# Patient Record
Sex: MALE | Race: WHITE | NOT HISPANIC OR LATINO | Employment: UNEMPLOYED | URBAN - METROPOLITAN AREA
[De-identification: names, ages, dates, MRNs, and addresses within clinical notes are randomized per-mention and may not be internally consistent; named-entity substitution may affect disease eponyms.]

---

## 2018-08-02 ENCOUNTER — WALK IN (OUTPATIENT)
Dept: URGENT CARE | Age: 23
End: 2018-08-02

## 2018-08-02 VITALS
DIASTOLIC BLOOD PRESSURE: 70 MMHG | WEIGHT: 180.89 LBS | SYSTOLIC BLOOD PRESSURE: 122 MMHG | TEMPERATURE: 98.3 F | RESPIRATION RATE: 16 BRPM | HEART RATE: 75 BPM | OXYGEN SATURATION: 98 %

## 2018-08-02 DIAGNOSIS — J02.9 PHARYNGITIS, UNSPECIFIED ETIOLOGY: ICD-10-CM

## 2018-08-02 DIAGNOSIS — J03.90 TONSILLITIS: Primary | ICD-10-CM

## 2018-08-02 LAB — S PYO AG THROAT QL: NEGATIVE

## 2018-08-02 PROCEDURE — 99213 OFFICE O/P EST LOW 20 MIN: CPT | Performed by: PHYSICIAN ASSISTANT

## 2018-08-02 PROCEDURE — 87880 STREP A ASSAY W/OPTIC: CPT | Performed by: INTERNAL MEDICINE

## 2018-08-02 PROCEDURE — 87081 CULTURE SCREEN ONLY: CPT | Performed by: INTERNAL MEDICINE

## 2018-08-04 LAB
REPORT STATUS (RPT): NORMAL
S PYO SPEC QL CULT: NORMAL
SPECIMEN SOURCE: NORMAL

## 2018-08-05 ENCOUNTER — WALK IN (OUTPATIENT)
Dept: URGENT CARE | Age: 23
End: 2018-08-05

## 2018-08-05 VITALS
OXYGEN SATURATION: 99 % | DIASTOLIC BLOOD PRESSURE: 80 MMHG | WEIGHT: 176 LBS | TEMPERATURE: 98.2 F | SYSTOLIC BLOOD PRESSURE: 120 MMHG | HEART RATE: 92 BPM

## 2018-08-05 DIAGNOSIS — J03.90 TONSILLITIS: Primary | ICD-10-CM

## 2018-08-05 PROCEDURE — 99212 OFFICE O/P EST SF 10 MIN: CPT | Performed by: FAMILY MEDICINE

## 2018-08-05 RX ORDER — AMOXICILLIN AND CLAVULANATE POTASSIUM 875; 125 MG/1; MG/1
1 TABLET, FILM COATED ORAL EVERY 12 HOURS
Qty: 20 TABLET | Refills: 0 | Status: SHIPPED | OUTPATIENT
Start: 2018-08-05 | End: 2019-09-02 | Stop reason: ALTCHOICE

## 2018-08-08 ENCOUNTER — TELEPHONE (OUTPATIENT)
Dept: TELEHEALTH | Age: 23
End: 2018-08-08

## 2019-08-29 ENCOUNTER — WALK IN (OUTPATIENT)
Dept: URGENT CARE | Age: 24
End: 2019-08-29

## 2019-08-29 DIAGNOSIS — J02.9 VIRAL PHARYNGITIS: Primary | ICD-10-CM

## 2019-08-29 DIAGNOSIS — J02.9 SORE THROAT: ICD-10-CM

## 2019-08-29 LAB
INTERNAL PROCEDURAL CONTROLS ACCEPTABLE: YES
S PYO AG THROAT QL IA.RAPID: NEGATIVE

## 2019-08-29 PROCEDURE — 87880 STREP A ASSAY W/OPTIC: CPT | Performed by: PHYSICIAN ASSISTANT

## 2019-08-29 PROCEDURE — 99213 OFFICE O/P EST LOW 20 MIN: CPT | Performed by: PHYSICIAN ASSISTANT

## 2019-08-29 ASSESSMENT — PAIN SCALES - GENERAL: PAINLEVEL: 3-4

## 2019-09-02 ENCOUNTER — WALK IN (OUTPATIENT)
Dept: URGENT CARE | Age: 24
End: 2019-09-02

## 2019-09-02 VITALS
OXYGEN SATURATION: 99 % | BODY MASS INDEX: 21.76 KG/M2 | DIASTOLIC BLOOD PRESSURE: 78 MMHG | HEART RATE: 84 BPM | HEIGHT: 75 IN | WEIGHT: 175 LBS | SYSTOLIC BLOOD PRESSURE: 128 MMHG | TEMPERATURE: 98.4 F | RESPIRATION RATE: 14 BRPM

## 2019-09-02 DIAGNOSIS — J01.00 ACUTE NON-RECURRENT MAXILLARY SINUSITIS: Primary | ICD-10-CM

## 2019-09-02 PROCEDURE — 99202 OFFICE O/P NEW SF 15 MIN: CPT | Performed by: EMERGENCY MEDICINE

## 2019-09-02 RX ORDER — AMOXICILLIN AND CLAVULANATE POTASSIUM 500; 125 MG/1; MG/1
1 TABLET, FILM COATED ORAL 2 TIMES DAILY
Qty: 20 TABLET | Refills: 0 | Status: SHIPPED | OUTPATIENT
Start: 2019-09-02 | End: 2019-09-12

## 2019-09-02 ASSESSMENT — ENCOUNTER SYMPTOMS
COUGH: 1
SHORTNESS OF BREATH: 0
ABDOMINAL PAIN: 0
SORE THROAT: 1
FEVER: 0
DIARRHEA: 0
APPETITE CHANGE: 0
SINUS PRESSURE: 1
VOMITING: 0

## 2019-09-13 ENCOUNTER — LAB SERVICES (OUTPATIENT)
Dept: LAB | Age: 24
End: 2019-09-13

## 2019-09-13 ENCOUNTER — WALK IN (OUTPATIENT)
Dept: URGENT CARE | Age: 24
End: 2019-09-13

## 2019-09-13 VITALS
RESPIRATION RATE: 16 BRPM | SYSTOLIC BLOOD PRESSURE: 107 MMHG | WEIGHT: 181 LBS | DIASTOLIC BLOOD PRESSURE: 72 MMHG | TEMPERATURE: 98.7 F | HEIGHT: 75 IN | HEART RATE: 112 BPM | BODY MASS INDEX: 22.5 KG/M2

## 2019-09-13 DIAGNOSIS — Z11.3 SCREEN FOR STD (SEXUALLY TRANSMITTED DISEASE): ICD-10-CM

## 2019-09-13 DIAGNOSIS — Z11.3 SCREEN FOR STD (SEXUALLY TRANSMITTED DISEASE): Primary | ICD-10-CM

## 2019-09-13 PROCEDURE — 87491 CHLMYD TRACH DNA AMP PROBE: CPT | Performed by: INTERNAL MEDICINE

## 2019-09-13 PROCEDURE — 87591 N.GONORRHOEAE DNA AMP PROB: CPT | Performed by: INTERNAL MEDICINE

## 2019-09-13 PROCEDURE — 86695 HERPES SIMPLEX TYPE 1 TEST: CPT | Performed by: INTERNAL MEDICINE

## 2019-09-13 PROCEDURE — 86592 SYPHILIS TEST NON-TREP QUAL: CPT | Performed by: INTERNAL MEDICINE

## 2019-09-13 PROCEDURE — 99212 OFFICE O/P EST SF 10 MIN: CPT | Performed by: FAMILY MEDICINE

## 2019-09-13 PROCEDURE — 36415 COLL VENOUS BLD VENIPUNCTURE: CPT | Performed by: INTERNAL MEDICINE

## 2019-09-13 PROCEDURE — 87389 HIV-1 AG W/HIV-1&-2 AB AG IA: CPT | Performed by: INTERNAL MEDICINE

## 2019-09-13 PROCEDURE — 86696 HERPES SIMPLEX TYPE 2 TEST: CPT | Performed by: INTERNAL MEDICINE

## 2019-09-14 LAB
HIV 1+2 AB+HIV1 P24 AG SERPL QL IA: NONREACTIVE
RPR SER QL: NONREACTIVE

## 2019-09-16 ENCOUNTER — TELEPHONE (OUTPATIENT)
Dept: URGENT CARE | Age: 24
End: 2019-09-16

## 2019-09-16 LAB
C TRACH RRNA SPEC QL NAA+PROBE: NEGATIVE
HSV1 GG IGG SER-ACNC: POSITIVE
HSV2 GG IGG SER-ACNC: NEGATIVE
N GONORRHOEA RRNA SPEC QL NAA+PROBE: NEGATIVE
SPECIMEN SOURCE: NORMAL

## 2019-10-18 ENCOUNTER — CASE MANAGEMENT (OUTPATIENT)
Dept: INTERNAL MEDICINE | Age: 24
End: 2019-10-18

## 2019-10-18 DIAGNOSIS — Z23 NEED FOR INFLUENZA VACCINATION: ICD-10-CM

## 2019-10-18 PROCEDURE — 90686 IIV4 VACC NO PRSV 0.5 ML IM: CPT | Performed by: FAMILY MEDICINE

## 2019-10-18 PROCEDURE — 90471 IMMUNIZATION ADMIN: CPT | Performed by: FAMILY MEDICINE

## 2020-01-10 ENCOUNTER — OFFICE VISIT (OUTPATIENT)
Dept: OCCUPATIONAL MEDICINE | Age: 25
End: 2020-01-10

## 2020-01-10 DIAGNOSIS — Z02.89 VISIT FOR OCCUPATIONAL HEALTH EXAMINATION: Primary | ICD-10-CM

## 2020-01-10 PROCEDURE — OH035 DOT/N-DOT DS COLLECTION ONLY (ALL TYPES): Performed by: PREVENTIVE MEDICINE

## 2021-03-03 ENCOUNTER — OFFICE VISIT (OUTPATIENT)
Dept: OCCUPATIONAL MEDICINE | Age: 26
End: 2021-03-03

## 2021-03-03 DIAGNOSIS — Z02.89 VISIT FOR OCCUPATIONAL HEALTH EXAMINATION: Primary | ICD-10-CM

## 2021-03-03 PROCEDURE — OH123 RAPID TEST DRUG KIT & COLLECTION 5 PANEL: Performed by: PHYSICIAN ASSISTANT

## 2021-05-25 VITALS
SYSTOLIC BLOOD PRESSURE: 124 MMHG | DIASTOLIC BLOOD PRESSURE: 78 MMHG | WEIGHT: 184 LBS | BODY MASS INDEX: 22.88 KG/M2 | RESPIRATION RATE: 16 BRPM | HEART RATE: 82 BPM | OXYGEN SATURATION: 97 % | HEIGHT: 75 IN | TEMPERATURE: 99.4 F

## 2021-08-02 ENCOUNTER — WALK IN (OUTPATIENT)
Dept: URGENT CARE | Age: 26
End: 2021-08-02

## 2021-08-02 VITALS
TEMPERATURE: 98.7 F | SYSTOLIC BLOOD PRESSURE: 132 MMHG | OXYGEN SATURATION: 97 % | RESPIRATION RATE: 14 BRPM | DIASTOLIC BLOOD PRESSURE: 94 MMHG | HEART RATE: 74 BPM

## 2021-08-02 DIAGNOSIS — S61.412A LACERATION OF LEFT HAND WITHOUT FOREIGN BODY, INITIAL ENCOUNTER: Primary | ICD-10-CM

## 2021-08-02 PROCEDURE — 12002 RPR S/N/AX/GEN/TRNK2.6-7.5CM: CPT | Performed by: PHYSICIAN ASSISTANT

## 2021-08-02 PROCEDURE — 90471 IMMUNIZATION ADMIN: CPT | Performed by: PHYSICIAN ASSISTANT

## 2021-08-02 PROCEDURE — 90715 TDAP VACCINE 7 YRS/> IM: CPT | Performed by: PHYSICIAN ASSISTANT

## 2021-08-02 PROCEDURE — 99213 OFFICE O/P EST LOW 20 MIN: CPT | Performed by: PHYSICIAN ASSISTANT

## 2021-08-02 RX ORDER — CETIRIZINE HYDROCHLORIDE 10 MG/1
10 TABLET ORAL DAILY
COMMUNITY

## 2021-08-03 ASSESSMENT — ENCOUNTER SYMPTOMS
NUMBNESS: 0
WOUND: 1
WEAKNESS: 0

## 2021-08-08 ENCOUNTER — TELEPHONE (OUTPATIENT)
Dept: URGENT CARE | Age: 26
End: 2021-08-08

## 2021-08-08 ENCOUNTER — WALK IN (OUTPATIENT)
Dept: URGENT CARE | Age: 26
End: 2021-08-08

## 2021-08-08 VITALS
OXYGEN SATURATION: 98 % | HEART RATE: 87 BPM | RESPIRATION RATE: 16 BRPM | SYSTOLIC BLOOD PRESSURE: 130 MMHG | TEMPERATURE: 99.2 F | DIASTOLIC BLOOD PRESSURE: 70 MMHG

## 2021-08-08 DIAGNOSIS — S61.012D LACERATION OF LEFT THUMB WITHOUT FOREIGN BODY WITHOUT DAMAGE TO NAIL, SUBSEQUENT ENCOUNTER: ICD-10-CM

## 2021-08-08 DIAGNOSIS — Z48.02 VISIT FOR SUTURE REMOVAL: Primary | ICD-10-CM

## 2021-08-08 PROBLEM — T14.8XXA WOUND INFECTION: Status: ACTIVE | Noted: 2021-08-08

## 2021-08-08 PROBLEM — L08.9 WOUND INFECTION: Status: ACTIVE | Noted: 2021-08-08

## 2021-08-08 RX ORDER — CEPHALEXIN 500 MG/1
500 CAPSULE ORAL 4 TIMES DAILY
Qty: 40 CAPSULE | Refills: 0 | Status: SHIPPED | OUTPATIENT
Start: 2021-08-08

## 2021-08-12 ENCOUNTER — WALK IN (OUTPATIENT)
Dept: URGENT CARE | Age: 26
End: 2021-08-12

## 2021-08-12 VITALS
SYSTOLIC BLOOD PRESSURE: 116 MMHG | WEIGHT: 185 LBS | OXYGEN SATURATION: 97 % | TEMPERATURE: 98.4 F | HEIGHT: 75 IN | HEART RATE: 97 BPM | BODY MASS INDEX: 23 KG/M2 | RESPIRATION RATE: 16 BRPM | DIASTOLIC BLOOD PRESSURE: 64 MMHG

## 2021-08-12 DIAGNOSIS — Z51.89 VISIT FOR WOUND CHECK: Primary | ICD-10-CM

## 2021-08-12 ASSESSMENT — ENCOUNTER SYMPTOMS
CHILLS: 0
WOUND: 1
FEVER: 0
COLOR CHANGE: 0

## 2021-08-12 ASSESSMENT — PAIN SCALES - GENERAL: PAINLEVEL: 0

## 2023-01-05 ENCOUNTER — ANCILLARY PROCEDURE (OUTPATIENT)
Dept: GENERAL RADIOLOGY | Facility: CLINIC | Age: 28
End: 2023-01-05
Attending: INTERNAL MEDICINE
Payer: COMMERCIAL

## 2023-01-05 ENCOUNTER — OFFICE VISIT (OUTPATIENT)
Dept: FAMILY MEDICINE | Facility: CLINIC | Age: 28
End: 2023-01-05
Payer: COMMERCIAL

## 2023-01-05 VITALS
TEMPERATURE: 98.1 F | HEART RATE: 94 BPM | OXYGEN SATURATION: 99 % | SYSTOLIC BLOOD PRESSURE: 124 MMHG | RESPIRATION RATE: 16 BRPM | WEIGHT: 193 LBS | DIASTOLIC BLOOD PRESSURE: 76 MMHG

## 2023-01-05 DIAGNOSIS — M54.50 ACUTE BILATERAL LOW BACK PAIN WITHOUT SCIATICA: Primary | ICD-10-CM

## 2023-01-05 DIAGNOSIS — M54.50 ACUTE BILATERAL LOW BACK PAIN WITHOUT SCIATICA: ICD-10-CM

## 2023-01-05 PROCEDURE — 72100 X-RAY EXAM L-S SPINE 2/3 VWS: CPT | Mod: TC | Performed by: RADIOLOGY

## 2023-01-05 PROCEDURE — 99203 OFFICE O/P NEW LOW 30 MIN: CPT | Performed by: INTERNAL MEDICINE

## 2023-01-05 ASSESSMENT — ENCOUNTER SYMPTOMS
ABDOMINAL PAIN: 0
ABDOMINAL DISTENTION: 0
FATIGUE: 0
PARESTHESIAS: 0
BACK PAIN: 1
CHILLS: 0
PALPITATIONS: 0
NUMBNESS: 0
ARTHRALGIAS: 1
FEVER: 0
UNEXPECTED WEIGHT CHANGE: 0
WEAKNESS: 0
SHORTNESS OF BREATH: 0
COUGH: 0

## 2023-01-05 ASSESSMENT — PAIN SCALES - GENERAL: PAINLEVEL: SEVERE PAIN (6)

## 2023-01-05 NOTE — PROGRESS NOTES
Assessment & Plan     Acute bilateral low back pain without sciatica  1 month of low back pain without sciatica, neurologic or other findings on exam, or any red flag symptoms.  Lumbosacral spine x-ray negative.    Ibuprofen as needed for pain.  Lidocaine patch as needed.  Referral for PT.  Patient should call or message me if not better in a couple of weeks                 No follow-ups on file.    Ryan Mcneill MD  [facility]    Imani Lui is a 27 year old, presenting for the following health issues:  Back Pain (Low back pain radiating into the hips. No known injury.)      Back Pain   Pertinent negatives include no chest pain, no fever, no numbness, no abdominal pain, no paresthesias and no weakness.   History of Present Illness       Back Pain:  He presents for follow up of back pain. Patient's back pain is a new problem.    Original cause of back pain: not sure  First noticed back pain: more than 1 month ago  Patient feels back pain: constantlyLocation of back pain:  Right lower back, left lower back, right hip and left hip  Description of back pain: dull ache, sharp and shooting  Back pain spreads: right thigh and left thigh    Since patient first noticed back pain, pain is: always present, but gets better and worse  Does back pain interfere with his job:  Yes  On a scale of 1-10 (10 being the worst), patient describes pain as:  8  What makes back pain worse: bending, certain positions, sitting and twisting  Acupuncture: not tried  Acetaminophen: not helpful  Activity or exercise: not helpful  Chiropractor:  Not tried  Cold: not helpful  Heat: helpful  Massage: not tried  Muscle relaxants: not tried  NSAIDS: not helpful  Opioids: not tried  Physical Therapy: not helpful  Rest: not helpful  Steroid Injection: not tried  Stretching: not tried  Surgery: not tried  TENS unit: not tried  Topical pain relievers: not tried  Other healthcare providers patient is seeing for back  pain: None    He eats 2-3 servings of fruits and vegetables daily.He consumes 0 sweetened beverage(s) daily.He exercises with enough effort to increase his heart rate 20 to 29 minutes per day.  He exercises with enough effort to increase his heart rate 4 days per week.   He is taking medications regularly.     Hu is a healthy 27-year-old  who presents with 1 month of low back pain.  He had to drive to [place] and had to get out of the car at frequent intervals because of the discomfort.  Does not radiate to his legs.  No leg weakness or paresthesias.  No bowel or bladder symptoms.  No fever or history of cancer.  No trauma.  He has a hobby of woodworking and had lifted a table saw out of his truck but not directly before this happened.        Review of Systems   Constitutional: Negative for chills, fatigue, fever and unexpected weight change.   HENT: Negative for congestion.    Eyes: Negative for visual disturbance.   Respiratory: Negative for cough and shortness of breath.    Cardiovascular: Negative for chest pain, palpitations and peripheral edema.   Gastrointestinal: Negative for abdominal distention and abdominal pain.   Musculoskeletal: Positive for arthralgias and back pain.   Neurological: Negative for weakness, numbness and paresthesias.            Objective    /76 (BP Location: Right arm, Patient Position: Sitting)   Pulse 94   Temp 98.1  F (36.7  C) (Tympanic)   Resp 16   Wt 87.5 kg (193 lb)   SpO2 99%   There is no height or weight on file to calculate BMI.  Physical Exam   Patient is a healthy-appearing young man in no distress  Lungs clear  Cardiac exam regular.  No edema.  Normal carotid and posterior tibial pulsations  Abdomen nontender  Back nontender.  Back is a normal range of motion, he is able to touch his toes.  Strength in the legs normal.  Gait, toe, heel walking, and Trendelenburg normal bilaterally.  Sensation intact in the feet and lower leg to vibration and  monofilament.  Feet normal    Lumbar spine sacral spine x-ray to my eye appears normal.

## 2023-01-19 ASSESSMENT — ENCOUNTER SYMPTOMS
PALPITATIONS: 0
CHILLS: 0
WEAKNESS: 0
HEMATOCHEZIA: 0
HEARTBURN: 0
DIARRHEA: 0
DIZZINESS: 0
DYSURIA: 0
HEADACHES: 0
NERVOUS/ANXIOUS: 1
SHORTNESS OF BREATH: 0
NAUSEA: 0
SORE THROAT: 0
PARESTHESIAS: 0
ARTHRALGIAS: 0
MYALGIAS: 0
FEVER: 0
EYE PAIN: 0
CONSTIPATION: 0
ABDOMINAL PAIN: 0
JOINT SWELLING: 0
HEMATURIA: 0
FREQUENCY: 0
COUGH: 0

## 2023-01-26 ENCOUNTER — OFFICE VISIT (OUTPATIENT)
Dept: INTERNAL MEDICINE | Facility: CLINIC | Age: 28
End: 2023-01-26
Payer: COMMERCIAL

## 2023-01-26 VITALS
SYSTOLIC BLOOD PRESSURE: 128 MMHG | OXYGEN SATURATION: 99 % | BODY MASS INDEX: 23.38 KG/M2 | DIASTOLIC BLOOD PRESSURE: 76 MMHG | HEART RATE: 64 BPM | TEMPERATURE: 97.9 F | RESPIRATION RATE: 20 BRPM | WEIGHT: 188 LBS | HEIGHT: 75 IN

## 2023-01-26 DIAGNOSIS — Z11.59 NEED FOR HEPATITIS C SCREENING TEST: ICD-10-CM

## 2023-01-26 DIAGNOSIS — Z11.4 SCREENING FOR HIV (HUMAN IMMUNODEFICIENCY VIRUS): ICD-10-CM

## 2023-01-26 DIAGNOSIS — Z00.00 ROUTINE GENERAL MEDICAL EXAMINATION AT A HEALTH CARE FACILITY: Primary | ICD-10-CM

## 2023-01-26 LAB
ERYTHROCYTE [DISTWIDTH] IN BLOOD BY AUTOMATED COUNT: 11.3 % (ref 10–15)
HCT VFR BLD AUTO: 47.5 % (ref 40–53)
HGB BLD-MCNC: 16.7 G/DL (ref 13.3–17.7)
MCH RBC QN AUTO: 30.5 PG (ref 26.5–33)
MCHC RBC AUTO-ENTMCNC: 35.2 G/DL (ref 31.5–36.5)
MCV RBC AUTO: 87 FL (ref 78–100)
PLATELET # BLD AUTO: 231 10E3/UL (ref 150–450)
RBC # BLD AUTO: 5.48 10E6/UL (ref 4.4–5.9)
WBC # BLD AUTO: 6.4 10E3/UL (ref 4–11)

## 2023-01-26 PROCEDURE — 36415 COLL VENOUS BLD VENIPUNCTURE: CPT

## 2023-01-26 PROCEDURE — 99395 PREV VISIT EST AGE 18-39: CPT | Mod: 25

## 2023-01-26 PROCEDURE — 86803 HEPATITIS C AB TEST: CPT

## 2023-01-26 PROCEDURE — 85027 COMPLETE CBC AUTOMATED: CPT

## 2023-01-26 PROCEDURE — 90686 IIV4 VACC NO PRSV 0.5 ML IM: CPT

## 2023-01-26 PROCEDURE — 87389 HIV-1 AG W/HIV-1&-2 AB AG IA: CPT

## 2023-01-26 PROCEDURE — 80061 LIPID PANEL: CPT

## 2023-01-26 PROCEDURE — 90651 9VHPV VACCINE 2/3 DOSE IM: CPT

## 2023-01-26 PROCEDURE — 90471 IMMUNIZATION ADMIN: CPT

## 2023-01-26 PROCEDURE — 80053 COMPREHEN METABOLIC PANEL: CPT

## 2023-01-26 PROCEDURE — 90472 IMMUNIZATION ADMIN EACH ADD: CPT

## 2023-01-26 ASSESSMENT — ENCOUNTER SYMPTOMS
NAUSEA: 0
HEMATURIA: 0
DIARRHEA: 0
HEADACHES: 0
WEAKNESS: 0
JOINT SWELLING: 0
PALPITATIONS: 0
PARESTHESIAS: 0
CONSTIPATION: 0
HEMATOCHEZIA: 0
DYSURIA: 0
EYE PAIN: 0
MYALGIAS: 0
CHILLS: 0
COUGH: 0
HEARTBURN: 0
FREQUENCY: 0
ARTHRALGIAS: 0
SHORTNESS OF BREATH: 0
SORE THROAT: 0
ABDOMINAL PAIN: 0
NERVOUS/ANXIOUS: 1
FEVER: 0
DIZZINESS: 0

## 2023-01-26 NOTE — PATIENT INSTRUCTIONS
Get medical records from previous facility     Exercise, journaling, deep breathing, and potentially therapy    Our lab is in [address]. I will comment on your results when they are all back.

## 2023-01-26 NOTE — PROGRESS NOTES
SUBJECTIVE:   CC: Hu is an 27 year old who presents for preventative health visit.     Healthy Habits:     Getting at least 3 servings of Calcium per day:  Yes    Bi-annual eye exam:  Yes    Dental care twice a year:  Yes    Sleep apnea or symptoms of sleep apnea:  None    Diet:  Regular (no restrictions)    Frequency of exercise:  4-5 days/week    Duration of exercise:  30-45 minutes    Taking medications regularly:  Not Applicable    Medication side effects:  Not applicable    PHQ-2 Total Score: 2    Additional concerns today:  No      Today's PHQ-2 Score:   PHQ-2 ( 1999 Pfizer) 1/19/2023   Q1: Little interest or pleasure in doing things 1   Q2: Feeling down, depressed or hopeless 1   PHQ-2 Score 2   Q1: Little interest or pleasure in doing things Several days   Q2: Feeling down, depressed or hopeless Several days   PHQ-2 Score 2       Have you ever done Advance Care Planning? (For example, a Health Directive, POLST, or a discussion with a medical provider or your loved ones about your wishes): No, advance care planning information given to patient to review.  Patient declined advance care planning discussion at this time.    Social History     Tobacco Use     Smoking status: Never     Smokeless tobacco: Never   Substance Use Topics     Alcohol use: Yes     Alcohol/week: 1.0 standard drink     Types: 1 Standard drinks or equivalent per week     Comment: 1-2 beer with dinner         Alcohol Use 1/19/2023   Prescreen: >3 drinks/day or >7 drinks/week? Yes   AUDIT SCORE  5       Last PSA: No results found for: PSA    Reviewed orders with patient. Reviewed health maintenance and updated orders accordingly - Yes  Lab work is in process    Reviewed and updated as needed this visit by clinical staff   Tobacco  Allergies  Meds  Problems  Med Hx  Surg Hx  Fam Hx  Soc   Hx        Reviewed and updated as needed this visit by Provider   Tobacco   Meds     Fam Hx  Soc Hx       History reviewed. No pertinent past  "medical history.   Past Surgical History:   Procedure Laterality Date     NO HISTORY OF SURGERY         Review of Systems   Constitutional: Negative for chills and fever.   HENT: Negative for congestion, ear pain, hearing loss and sore throat.    Eyes: Negative for pain and visual disturbance.   Respiratory: Negative for cough and shortness of breath.    Cardiovascular: Negative for chest pain, palpitations and peripheral edema.   Gastrointestinal: Negative for abdominal pain, constipation, diarrhea, heartburn, hematochezia and nausea.   Genitourinary: Negative for dysuria, frequency, genital sores, hematuria, impotence, penile discharge and urgency.   Musculoskeletal: Negative for arthralgias, joint swelling and myalgias.   Skin: Negative for rash.   Neurological: Negative for dizziness, weakness, headaches and paresthesias.   Psychiatric/Behavioral: Negative for mood changes. The patient is nervous/anxious.      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     OBJECTIVE:   /76   Pulse 64   Temp 97.9  F (36.6  C) (Oral)   Resp 20   Ht 1.905 m (6' 3\")   Wt 85.3 kg (188 lb)   SpO2 99%   BMI 23.50 kg/m      Physical Exam  GENERAL: alert and no distress  EYES: Eyes grossly normal to inspection, PERRL and conjunctivae and sclerae normal  HENT: ear canals and TM's normal, nose and mouth without ulcers or lesions  NECK: no adenopathy, no asymmetry, masses, or scars and thyroid normal to palpation  RESP: lungs clear to auscultation - no rales, rhonchi or wheezes  CV: regular rate and rhythm, normal S1 S2, no S3 or S4, no murmur, click or rub, no peripheral edema and peripheral pulses strong  ABDOMEN: soft, nontender, no hepatosplenomegaly, no masses and bowel sounds normal  MS: no gross musculoskeletal defects noted, no edema  SKIN: no suspicious lesions or rashes  NEURO: Normal strength and " tone, mentation intact and speech normal  PSYCH: mentation appears normal, affect normal/bright    Diagnostic Test Results:  Labs reviewed in Epic    ASSESSMENT/PLAN:   (Z00.00) Routine general medical examination at a health care facility  (primary encounter diagnosis)  Comment: routine  Plan: Comprehensive metabolic panel (BMP + Alb, Alk         Phos, ALT, AST, Total. Bili, TP), CBC with         platelets, Lipid panel reflex to direct LDL         Non-fasting            (Z11.4) Screening for HIV (human immunodeficiency virus)  Comment: screening  Plan: HIV Antigen Antibody Combo            (Z11.59) Need for hepatitis C screening test  Comment: screen  Plan: Hepatitis C Screen Reflex to HCV RNA Quant and         Genotype            COUNSELING:   Reviewed preventive health counseling, as reflected in patient instructions        He reports that he has never smoked. He has never used smokeless tobacco.    Bobby Traore NP  [facility]

## 2023-01-26 NOTE — NURSING NOTE
"Chief Complaint   Patient presents with     Physical     initial /76   Pulse 64   Temp 97.9  F (36.6  C) (Oral)   Resp 20   Ht 1.905 m (6' 3\")   Wt 85.3 kg (188 lb)   SpO2 99%   BMI 23.50 kg/m   Estimated body mass index is 23.5 kg/m  as calculated from the following:    Height as of this encounter: 1.905 m (6' 3\").    Weight as of this encounter: 85.3 kg (188 lb)..  bp completed using cuff size large  CYNDEE BARNETT LPN  "

## 2023-01-27 ENCOUNTER — TELEPHONE (OUTPATIENT)
Dept: INTERNAL MEDICINE | Facility: CLINIC | Age: 28
End: 2023-01-27
Payer: COMMERCIAL

## 2023-01-27 LAB
ALBUMIN SERPL BCG-MCNC: 5 G/DL (ref 3.5–5.2)
ALP SERPL-CCNC: 69 U/L (ref 40–129)
ALT SERPL W P-5'-P-CCNC: 62 U/L (ref 10–50)
ANION GAP SERPL CALCULATED.3IONS-SCNC: 14 MMOL/L (ref 7–15)
AST SERPL W P-5'-P-CCNC: 38 U/L (ref 10–50)
BILIRUB SERPL-MCNC: 0.4 MG/DL
BUN SERPL-MCNC: 16.6 MG/DL (ref 6–20)
CALCIUM SERPL-MCNC: 10 MG/DL (ref 8.6–10)
CHLORIDE SERPL-SCNC: 100 MMOL/L (ref 98–107)
CHOLEST SERPL-MCNC: 245 MG/DL
CREAT SERPL-MCNC: 1.05 MG/DL (ref 0.67–1.17)
DEPRECATED HCO3 PLAS-SCNC: 25 MMOL/L (ref 22–29)
GFR SERPL CREATININE-BSD FRML MDRD: >90 ML/MIN/1.73M2
GLUCOSE SERPL-MCNC: 85 MG/DL (ref 70–99)
HCV AB SERPL QL IA: NONREACTIVE
HDLC SERPL-MCNC: 53 MG/DL
HIV 1+2 AB+HIV1 P24 AG SERPL QL IA: NONREACTIVE
LDLC SERPL CALC-MCNC: 170 MG/DL
NONHDLC SERPL-MCNC: 192 MG/DL
POTASSIUM SERPL-SCNC: 4.7 MMOL/L (ref 3.4–5.3)
PROT SERPL-MCNC: 7.6 G/DL (ref 6.4–8.3)
SODIUM SERPL-SCNC: 139 MMOL/L (ref 136–145)
TRIGL SERPL-MCNC: 110 MG/DL

## 2023-01-27 NOTE — TELEPHONE ENCOUNTER
Left voice mail with patient that he can go to [place] Immunization registry to obtain his immunization from that state.     He can fax them to us, drop them off in person or upload to his mychart.     Saul in [place] fax number was not working.

## 2023-02-10 ENCOUNTER — DOCUMENTATION ONLY (OUTPATIENT)
Dept: LAB | Facility: CLINIC | Age: 28
End: 2023-02-10
Payer: COMMERCIAL

## 2023-02-10 DIAGNOSIS — R79.89 ELEVATED LFTS: Primary | ICD-10-CM

## 2023-02-14 ENCOUNTER — MYC MEDICAL ADVICE (OUTPATIENT)
Dept: INTERNAL MEDICINE | Facility: CLINIC | Age: 28
End: 2023-02-14
Payer: COMMERCIAL

## 2023-02-21 ENCOUNTER — LAB (OUTPATIENT)
Dept: LAB | Facility: CLINIC | Age: 28
End: 2023-02-21
Payer: COMMERCIAL

## 2023-02-21 DIAGNOSIS — R79.89 ELEVATED LFTS: ICD-10-CM

## 2023-02-21 LAB
ALBUMIN SERPL BCG-MCNC: 4.9 G/DL (ref 3.5–5.2)
ALP SERPL-CCNC: 60 U/L (ref 40–129)
ALT SERPL W P-5'-P-CCNC: 44 U/L (ref 10–50)
AST SERPL W P-5'-P-CCNC: 28 U/L (ref 10–50)
BILIRUB DIRECT SERPL-MCNC: <0.2 MG/DL (ref 0–0.3)
BILIRUB SERPL-MCNC: 0.5 MG/DL
PROT SERPL-MCNC: 7.2 G/DL (ref 6.4–8.3)

## 2023-02-21 PROCEDURE — 80076 HEPATIC FUNCTION PANEL: CPT

## 2023-02-21 PROCEDURE — 36415 COLL VENOUS BLD VENIPUNCTURE: CPT

## 2023-11-03 ASSESSMENT — ENCOUNTER SYMPTOMS
SORE THROAT: 0
FEVER: 0
CONSTIPATION: 0
EYE PAIN: 0
NERVOUS/ANXIOUS: 0
DIZZINESS: 0
DYSURIA: 0
COUGH: 0
NAUSEA: 0
ABDOMINAL PAIN: 0
WEAKNESS: 0
SHORTNESS OF BREATH: 0
HEADACHES: 0
HEMATURIA: 0
HEMATOCHEZIA: 0
PALPITATIONS: 0
DIARRHEA: 0
CHILLS: 0
JOINT SWELLING: 0
MYALGIAS: 0
HEARTBURN: 0
FREQUENCY: 0
PARESTHESIAS: 0
ARTHRALGIAS: 0

## 2023-11-03 NOTE — COMMUNITY RESOURCES LIST (ENGLISH)
11/03/2023   [facility]  N/A  For questions about this resource list or additional care needs, please contact your primary care clinic or care manager.  Phone: [phone]   Email: N/A   Address: [address] [place] [address]   Hours: N/A        Financial Stability       Rent and mortgage payment assistance  1  [address] [facility] Distance: 1.94 miles      In-Person, Phone/Virtual   [address] [place] [address]  Language: English, Polish  Hours: Mon - Thu 9:00 AM - 4:00 PM  Fees: Free   Phone: (251) 614-3571 Email: info@M[employer]. Website: https://The Beauty Tribe/     2  [facility] Community Development Agency ([facility]) Distance: 4.77 miles      In-Person   [address] [place] [address]  Language: English  Hours: Mon - Fri 9:00 AM - 4:00 PM  Fees: Free   Phone: (877) 290-7756 Email: info@Blue MountainSnowman.Pending sale to [facility].mn. Website: http://www.EvergreenHealthB-152.org/          Food and Nutrition       Food pantry  3  [facility] Distance: 0.94 miles      Pick   [address] [place] [address]  Language: English  Hours: Sat 11:00 AM - 1:00 PM  Fees: Free   Phone: (327) 984-7606 Email: elisabeth@Emme E2MS.[employer] Website: http://www.Cirrascale.TrueMotion Spine/     4  [employer] Distance: 1.27 miles      In-Person   [address] [place] [address]  Language: English  Hours: Tue 10:00 AM - 12:00 PM  Fees: Free   Phone: (136) 265-2456 Ext.234 Website: http://www.Retidoc.org     SNAP application assistance  5  [address] [place] [address] Distance: 6.83 miles      In-Person   [address] [place] [address]  Language: English  Hours: Mon 8:00 AM - 4:00 PM , Tue 8:00 AM - 7:00 PM , Wed - Thu 8:00 AM - 4:00 PM  Fees: Free   Phone: (660) 141-2664 Email: info@Fungos.org Website: https://pSiFlow Technology.org/resources/resource-centers/     6  Community Action Partnership  (CAP) of [facility] Distance: 7.87 miles      In-Person   [address] [place] [address]  Language: English, Irish  Hours: Mon - Fri 8:00 AM - 8:00 PM  Fees: Free   Phone: (105) 744-6255 Email: info@capPodimetrics.org Website: http://www.capPodimetrics.org     Soup kitchen or free meals  7  Shahriar the [facility] Distance: 3.91 miles      Pickup   [address] [place] [address]  Language: English  Hours: Mon - Fri 5:00 PM - 6:00 PM  Fees: Free   Phone: (570) 720-4102 Email: contactus@Rotapanel.LoveByte Website: https://www.1C Company/     8  [place] by the [facility] [employer] [employer] Distance: 3.93 miles      Pickup   [address] [place] [address]  Language: English, Irish  Hours: Mon - Thu 5:30 PM - 6:30 PM  Fees: Free   Phone: (405) 329-7925 Email: louie@Fronto Website: http://Fronto/wordNetwork Merchants/?page_id=5168          Important Numbers & Websites       Emergency Services   911  [facility]   311  Poison Control   (336) 446-2560  Suicide Prevention Lifeline   (398) 797-6220 (TALK)  Child Abuse Hotline   (840) 761-8769 (4-A-Child)  Sexual Assault Hotline   (362) 240-7371 (HOPE)  National Runaway Safeline   (725) 218-6317 (RUNAWAY)  All-Options Talkline   (219) 126-5238  Substance Abuse Referral   (565) 541-1018 (HELP)

## 2023-11-06 ENCOUNTER — OFFICE VISIT (OUTPATIENT)
Dept: FAMILY MEDICINE | Facility: CLINIC | Age: 28
End: 2023-11-06
Payer: COMMERCIAL

## 2023-11-06 VITALS
OXYGEN SATURATION: 99 % | HEIGHT: 75 IN | SYSTOLIC BLOOD PRESSURE: 138 MMHG | BODY MASS INDEX: 24.51 KG/M2 | WEIGHT: 197.1 LBS | HEART RATE: 74 BPM | DIASTOLIC BLOOD PRESSURE: 88 MMHG | RESPIRATION RATE: 23 BRPM | TEMPERATURE: 98 F

## 2023-11-06 DIAGNOSIS — B00.1 HERPES LABIALIS: ICD-10-CM

## 2023-11-06 DIAGNOSIS — Z00.00 ROUTINE GENERAL MEDICAL EXAMINATION AT A HEALTH CARE FACILITY: ICD-10-CM

## 2023-11-06 DIAGNOSIS — Z13.9 ENCOUNTER FOR SCREENING INVOLVING SOCIAL DETERMINANTS OF HEALTH (SDOH): Primary | ICD-10-CM

## 2023-11-06 PROCEDURE — 91320 SARSCV2 VAC 30MCG TRS-SUC IM: CPT | Performed by: NURSE PRACTITIONER

## 2023-11-06 PROCEDURE — 90686 IIV4 VACC NO PRSV 0.5 ML IM: CPT | Performed by: NURSE PRACTITIONER

## 2023-11-06 PROCEDURE — 90651 9VHPV VACCINE 2/3 DOSE IM: CPT | Performed by: NURSE PRACTITIONER

## 2023-11-06 PROCEDURE — 90480 ADMN SARSCOV2 VAC 1/ONLY CMP: CPT | Performed by: NURSE PRACTITIONER

## 2023-11-06 PROCEDURE — 90746 HEPB VACCINE 3 DOSE ADULT IM: CPT | Performed by: NURSE PRACTITIONER

## 2023-11-06 PROCEDURE — 90472 IMMUNIZATION ADMIN EACH ADD: CPT | Performed by: NURSE PRACTITIONER

## 2023-11-06 PROCEDURE — 90471 IMMUNIZATION ADMIN: CPT | Performed by: NURSE PRACTITIONER

## 2023-11-06 PROCEDURE — 80061 LIPID PANEL: CPT | Performed by: NURSE PRACTITIONER

## 2023-11-06 PROCEDURE — 99395 PREV VISIT EST AGE 18-39: CPT | Mod: 25 | Performed by: NURSE PRACTITIONER

## 2023-11-06 PROCEDURE — 82947 ASSAY GLUCOSE BLOOD QUANT: CPT | Performed by: NURSE PRACTITIONER

## 2023-11-06 PROCEDURE — 36415 COLL VENOUS BLD VENIPUNCTURE: CPT | Performed by: NURSE PRACTITIONER

## 2023-11-06 RX ORDER — VALACYCLOVIR HYDROCHLORIDE 1 G/1
2000 TABLET, FILM COATED ORAL 2 TIMES DAILY
Qty: 8 TABLET | Refills: 3 | Status: SHIPPED | OUTPATIENT
Start: 2023-11-06 | End: 2023-12-19

## 2023-11-06 ASSESSMENT — ENCOUNTER SYMPTOMS
ARTHRALGIAS: 0
NERVOUS/ANXIOUS: 0
COUGH: 0
HEARTBURN: 0
SHORTNESS OF BREATH: 0
NAUSEA: 0
HEADACHES: 0
CONSTIPATION: 0
JOINT SWELLING: 0
HEMATOCHEZIA: 0
CHILLS: 0
FEVER: 0
EYE PAIN: 0
DYSURIA: 0
WEAKNESS: 0
HEMATURIA: 0
SORE THROAT: 0
DIZZINESS: 0
PALPITATIONS: 0
PARESTHESIAS: 0
DIARRHEA: 0
MYALGIAS: 0
FREQUENCY: 0
ABDOMINAL PAIN: 0

## 2023-11-06 ASSESSMENT — PAIN SCALES - GENERAL: PAINLEVEL: NO PAIN (0)

## 2023-11-06 NOTE — PROGRESS NOTES
SUBJECTIVE:   CC: Hu is an 28 year old who presents for preventative health visit.       11/6/2023     3:12 PM   Additional Questions   Roomed by Kelly ROBERTSON MA   Accompanied by Self         11/6/2023     3:12 PM   Patient Reported Additional Medications   Patient reports taking the following new medications None       Healthy Habits:     Getting at least 3 servings of Calcium per day:  Yes    Bi-annual eye exam:  Yes    Dental care twice a year:  Yes    Sleep apnea or symptoms of sleep apnea:  None    Diet:  Breakfast skipped    Frequency of exercise:  4-5 days/week    Duration of exercise:  45-60 minutes    Taking medications regularly:  Not Applicable    Medication side effects:  Not applicable    Additional concerns today:  No    Would like refill on valacyclovir for recurrent herpes labialis outbreaks.  Has worked well in past.    Social History     Tobacco Use    Smoking status: Never    Smokeless tobacco: Never   Substance Use Topics    Alcohol use: Yes     Alcohol/week: 1.0 standard drink of alcohol     Types: 1 Standard drinks or equivalent per week     Comment: 1-2 beer with dinner           11/3/2023    11:42 AM   Alcohol Use   Prescreen: >3 drinks/day or >7 drinks/week? No     Interested in stopping ETOH altogether.  He will be working on this in the next few months.      Reviewed and updated as needed this visit by clinical staff   Tobacco  Allergies  Meds  Problems  Med Hx  Surg Hx  Fam Hx          Reviewed and updated as needed this visit by Provider   Tobacco  Allergies  Meds  Problems  Med Hx  Surg Hx  Fam Hx             Review of Systems   Constitutional:  Negative for chills and fever.   HENT:  Negative for congestion, ear pain, hearing loss and sore throat.    Eyes:  Negative for pain and visual disturbance.   Respiratory:  Negative for cough and shortness of breath.    Cardiovascular:  Negative for chest pain, palpitations and peripheral edema.   Gastrointestinal:  Negative for  "abdominal pain, constipation, diarrhea, heartburn, hematochezia and nausea.   Genitourinary:  Negative for dysuria, frequency, genital sores, hematuria, impotence, penile discharge and urgency.   Musculoskeletal:  Negative for arthralgias, joint swelling and myalgias.   Skin:  Negative for rash.   Neurological:  Negative for dizziness, weakness, headaches and paresthesias.   Psychiatric/Behavioral:  Negative for mood changes. The patient is not nervous/anxious.          OBJECTIVE:   /88   Pulse 74   Temp 98  F (36.7  C) (Oral)   Resp 23   Ht 1.905 m (6' 3\")   Wt 89.4 kg (197 lb 1.6 oz)   SpO2 99%   BMI 24.64 kg/m      Physical Exam  Constitutional:       Appearance: Normal appearance.   HENT:      Head: Normocephalic.      Right Ear: Tympanic membrane and ear canal normal.      Left Ear: Tympanic membrane and ear canal normal.      Nose: Nose normal.      Mouth/Throat:      Mouth: Mucous membranes are moist.      Pharynx: Oropharynx is clear.   Cardiovascular:      Rate and Rhythm: Normal rate and regular rhythm.      Heart sounds: Normal heart sounds.   Pulmonary:      Effort: Pulmonary effort is normal.      Breath sounds: Normal breath sounds.   Abdominal:      General: Abdomen is flat. Bowel sounds are normal.      Palpations: Abdomen is soft.   Musculoskeletal:      Cervical back: Normal range of motion and neck supple.      Right lower leg: No edema.      Left lower leg: No edema.   Skin:     General: Skin is warm and dry.   Neurological:      Mental Status: He is alert.   Psychiatric:         Mood and Affect: Mood normal.           ASSESSMENT/PLAN:       ICD-10-CM    1. Encounter for screening involving social determinants of health (SDoH)  Z13.9 Lipid panel reflex to direct LDL Fasting     Glucose     Lipid panel reflex to direct LDL Fasting     Glucose      2. Routine general medical examination at a health care facility  Z00.00       3. Herpes labialis  B00.1 valACYclovir (VALTREX) 1000 mg " tablet        Wellness exam.   -Borderline BP, pt is working on diet and exercise.  Recheck in 6 months.    Labs for wellness benefit at his work.      COUNSELING:   Reviewed preventive health counseling, as reflected in patient instructions       Regular exercise       Healthy diet/nutrition        He reports that he has never smoked. He has never used smokeless tobacco.            ARIADNE Phelan CNP  [facility]

## 2023-11-06 NOTE — COMMUNITY RESOURCES LIST (ENGLISH)
11/06/2023   [facility]  N/A  For questions about this resource list or additional care needs, please contact your primary care clinic or care manager.  Phone: [phone]   Email: N/A   Address: [address] [place] [address]   Hours: N/A        Financial Stability       Rent and mortgage payment assistance  1  [address] [facility] Distance: 1.94 miles      In-Person, Phone/Virtual   [address] [place] [address]  Language: English, Romanian  Hours: Mon - Thu 9:00 AM - 4:00 PM  Fees: Free   Phone: (318) 671-5886 Email: info@Glopho Website: https://Baoku/     2  [facility] Community Development Agency ([facility]) Distance: 4.77 miles      In-Person   [address] [place] [address]  Language: English  Hours: Mon - Fri 9:00 AM - 4:00 PM  Fees: Free   Phone: (211) 800-4840 Email: info@BridgmanuserADgents.[facility].mn. Website: http://www.BridgmanuserADgents.org/          Food and Nutrition       Food pantry  3  The [facility] - Drive-Through Distance: 0.94 miles      Pick   [address] [place] [address]  Language: English  Hours: Sat 11:00 AM - 1:00 PM  Fees: Free   Phone: (962) 481-6983 Email: elisabeth@PlanSource Holdings.STORYS.JP Website: http://www.Rocket Internet.STORYS.JP/     4  [facility] Distance: 1.27 miles      In-Person   [address] [place] [address]  Language: English  Hours: Tue 10:00 AM - 12:00 PM  Fees: Free   Phone: (203) 185-9747 Ext.234 Website: http://www.Power2Switch.org     SNAP application assistance  5  [address] [place] [address] Distance: 6.83 miles      In-Person   [address] [place] [address]  Language: English  Hours: Mon 8:00 AM - 4:00 PM , Tue 8:00 AM - 7:00 PM , Wed - Thu 8:00 AM - 4:00 PM  Fees: Free   Phone: (445) 851-4915 Email: info@Rowbot Systems.org Website: https://Mesmo.tv.org/resources/resource-centers/     6  Community Action Partnership  (CAP) of [facility] Distance: 7.87 miles      In-Person   [address] [place] [address]  Language: English, Turkmen  Hours: Mon - Fri 8:00 AM - 8:00 PM  Fees: Free   Phone: (386) 564-6138 Email: info@capInfinity Business Group.org Website: http://www.capInfinity Business Group.org     Soup kitchen or free meals  7  Shahriar the [facility] Distance: 3.91 miles      Pickup   [address] [place] [address]  Language: English  Hours: Mon - Fri 5:00 PM - 6:00 PM  Fees: Free   Phone: (277) 477-7789 Email: contactus@TapShield.Shoop Website: https://www.Pharmaca/     8  [place] by the [facility] Distance: 3.93 miles      Pickup   [address] [place] [address]  Language: English, Turkmen  Hours: Mon - Thu 5:30 PM - 6:30 PM  Fees: Free   Phone: (822) 655-4921 Email: louie@Perpetuelle.com Website: http://Perpetuelle.com/wordFood Genius/?page_id=5168          Important Numbers & Websites       Emergency Services   911  [facility]   311  Poison Control   (122) 617-4271  Suicide Prevention Lifeline   (736) 423-2245 (TALK)  Child Abuse Hotline   (973) 172-3502 (4-A-Child)  Sexual Assault Hotline   (740) 595-2803 (HOPE)  National Runaway Safeline   (569) 881-9274 (RUNAWAY)  All-Options Talkline   (778) 494-1064  Substance Abuse Referral   (414) 663-2373 (HELP)

## 2023-11-07 LAB
CHOLEST SERPL-MCNC: 214 MG/DL
FASTING STATUS PATIENT QL REPORTED: YES
GLUCOSE SERPL-MCNC: 87 MG/DL (ref 70–99)
HDLC SERPL-MCNC: 58 MG/DL
LDLC SERPL CALC-MCNC: 133 MG/DL
NONHDLC SERPL-MCNC: 156 MG/DL
TRIGL SERPL-MCNC: 114 MG/DL

## 2023-12-19 ENCOUNTER — MYC REFILL (OUTPATIENT)
Dept: FAMILY MEDICINE | Facility: CLINIC | Age: 28
End: 2023-12-19
Payer: COMMERCIAL

## 2023-12-19 DIAGNOSIS — B00.1 HERPES LABIALIS: ICD-10-CM

## 2023-12-19 RX ORDER — VALACYCLOVIR HYDROCHLORIDE 1 G/1
2000 TABLET, FILM COATED ORAL 2 TIMES DAILY
Qty: 8 TABLET | Refills: 3 | Status: SHIPPED | OUTPATIENT
Start: 2023-12-19 | End: 2024-02-26

## 2024-02-26 ENCOUNTER — MYC REFILL (OUTPATIENT)
Dept: FAMILY MEDICINE | Facility: CLINIC | Age: 29
End: 2024-02-26
Payer: COMMERCIAL

## 2024-02-26 DIAGNOSIS — B00.1 HERPES LABIALIS: ICD-10-CM

## 2024-02-29 NOTE — TELEPHONE ENCOUNTER
"Routing to provider covering \"A\"     Please advise, thanks.    Thank you,  Casey Wagner, Triage RN [facility]  1:49 PM 2/29/2024     "

## 2024-03-01 NOTE — TELEPHONE ENCOUNTER
Please forward this to this provider        I have not seen the patient and Bobby Mar does not work anymore at this clinic

## 2024-03-04 RX ORDER — VALACYCLOVIR HYDROCHLORIDE 1 G/1
2000 TABLET, FILM COATED ORAL 2 TIMES DAILY
Qty: 8 TABLET | Refills: 3 | Status: SHIPPED | OUTPATIENT
Start: 2024-03-04 | End: 2024-07-31

## 2024-03-05 NOTE — TELEPHONE ENCOUNTER
Valtrex was filled.  Pt received a mychart in this encounter that it was filled.     2 seconds or less

## 2024-06-24 ENCOUNTER — TELEPHONE (OUTPATIENT)
Dept: FAMILY MEDICINE | Facility: CLINIC | Age: 29
End: 2024-06-24

## 2024-06-24 NOTE — TELEPHONE ENCOUNTER
Patient Quality Outreach    Patient is due for the following:   Depression  -  PHQ-9 needed and Depression follow-up visit    Next Steps:   Schedule a office visit for Depression Adult Preventative    Type of outreach:    Sent Widemile message.    Next Steps:  Reach out within 90 days via Widemile.    Max number of attempts reached: No. Will try again in 90 days if patient still on fail list.    Questions for provider review:    None           Theresa Gillespie [facility]  Chart routed to Care Team.

## 2024-07-31 ENCOUNTER — MYC REFILL (OUTPATIENT)
Dept: FAMILY MEDICINE | Facility: CLINIC | Age: 29
End: 2024-07-31
Payer: COMMERCIAL

## 2024-07-31 DIAGNOSIS — B00.1 HERPES LABIALIS: ICD-10-CM

## 2024-07-31 RX ORDER — VALACYCLOVIR HYDROCHLORIDE 1 G/1
2000 TABLET, FILM COATED ORAL 2 TIMES DAILY
Qty: 8 TABLET | Refills: 2 | Status: SHIPPED | OUTPATIENT
Start: 2024-07-31

## 2024-08-21 ENCOUNTER — MYC MEDICAL ADVICE (OUTPATIENT)
Dept: FAMILY MEDICINE | Facility: CLINIC | Age: 29
End: 2024-08-21

## 2024-08-21 ENCOUNTER — OFFICE VISIT (OUTPATIENT)
Dept: FAMILY MEDICINE | Facility: CLINIC | Age: 29
End: 2024-08-21
Attending: NURSE PRACTITIONER
Payer: COMMERCIAL

## 2024-08-21 VITALS
OXYGEN SATURATION: 99 % | WEIGHT: 191.1 LBS | DIASTOLIC BLOOD PRESSURE: 72 MMHG | BODY MASS INDEX: 23.76 KG/M2 | HEART RATE: 75 BPM | SYSTOLIC BLOOD PRESSURE: 138 MMHG | RESPIRATION RATE: 23 BRPM | HEIGHT: 75 IN | TEMPERATURE: 98 F

## 2024-08-21 DIAGNOSIS — Z11.3 SCREEN FOR STD (SEXUALLY TRANSMITTED DISEASE): Primary | ICD-10-CM

## 2024-08-21 DIAGNOSIS — R03.0 ELEVATED BP WITHOUT DIAGNOSIS OF HYPERTENSION: Primary | ICD-10-CM

## 2024-08-21 PROCEDURE — 99213 OFFICE O/P EST LOW 20 MIN: CPT | Performed by: NURSE PRACTITIONER

## 2024-08-21 ASSESSMENT — ENCOUNTER SYMPTOMS
UNEXPECTED WEIGHT CHANGE: 0
DYSURIA: 0
SHORTNESS OF BREATH: 0
CHEST TIGHTNESS: 0
CHILLS: 0
PALPITATIONS: 0
DIARRHEA: 0
FEVER: 0
CONSTIPATION: 0
ABDOMINAL PAIN: 0

## 2024-08-21 ASSESSMENT — PATIENT HEALTH QUESTIONNAIRE - PHQ9
SUM OF ALL RESPONSES TO PHQ QUESTIONS 1-9: 14
10. IF YOU CHECKED OFF ANY PROBLEMS, HOW DIFFICULT HAVE THESE PROBLEMS MADE IT FOR YOU TO DO YOUR WORK, TAKE CARE OF THINGS AT HOME, OR GET ALONG WITH OTHER PEOPLE: SOMEWHAT DIFFICULT
SUM OF ALL RESPONSES TO PHQ QUESTIONS 1-9: 14

## 2024-08-21 ASSESSMENT — PAIN SCALES - GENERAL: PAINLEVEL: NO PAIN (0)

## 2024-08-21 NOTE — PROGRESS NOTES
Assessment & Plan   Elevated BP without diagnosis of hypertension   Continues to be within acceptable limits.  Continue diet and exercise.  Annual physicals.    No follow-ups on file.  There are no Patient Instructions on file for this visit.  ARIADNE Phelan CNP  [facility] [place]  ============================================  Subjective  Elevated BP without diagnosis of hypertension  Elevated BP without HTN at last physical exam.  Is here for follow up.  Is working on diet and exercise.    Denies chest pain, dyspnea, edema, or TIA's.      Wt Readings from Last 4 Encounters:   08/21/24 86.7 kg (191 lb 1.6 oz)   11/06/23 89.4 kg (197 lb 1.6 oz)   01/26/23 85.3 kg (188 lb)   01/05/23 87.5 kg (193 lb)     Has lost a bit of weight.  Less stress with divorce moving forward.  Discussed healthy lifestyle and continuing to monitor given significant family history of hypertension.      Lab Results   Component Value Date    CHOL 214 11/06/2023     Lab Results   Component Value Date    HDL 58 11/06/2023     Lab Results   Component Value Date     11/06/2023     Lab Results   Component Value Date    TRIG 114 11/06/2023     Normal kidney function 1/26/2023        History of Present Illness       Hypertension: He presents for follow up of hypertension.  He does not check blood pressure  regularly outside of the clinic. Outpatient blood pressures have not been over 140/90. He does not follow a low salt diet.     He eats 2-3 servings of fruits and vegetables daily.He consumes 0 sweetened beverage(s) daily.He exercises with enough effort to increase his heart rate 30 to 60 minutes per day.  He exercises with enough effort to increase his heart rate 4 days per week.   He is taking medications regularly.    Reviewed and updated as needed this visit by Provider   Tobacco  Allergies  Meds  Problems  Med Hx  Surg Hx  Fam Hx         Review of Systems   Constitutional:  Negative for chills, fever and  "unexpected weight change.   HENT: Negative.     Respiratory:  Negative for chest tightness and shortness of breath.    Cardiovascular:  Negative for chest pain and palpitations.   Gastrointestinal:  Negative for abdominal pain, constipation and diarrhea.   Genitourinary:  Negative for dysuria.   Skin:  Negative for rash.     ============================================  Objective    /72 (BP Location: Right arm, Patient Position: Sitting, Cuff Size: Adult Large)   Pulse 75   Temp 98  F (36.7  C) (Oral)   Resp 23   Ht 1.905 m (6' 3\")   Wt 86.7 kg (191 lb 1.6 oz)   SpO2 99%   BMI 23.89 kg/m    Physical Exam  Constitutional:       Appearance: Normal appearance.   Eyes:      Conjunctiva/sclera: Conjunctivae normal.   Cardiovascular:      Rate and Rhythm: Normal rate and regular rhythm.      Heart sounds: Normal heart sounds.   Pulmonary:      Effort: Pulmonary effort is normal.      Breath sounds: Normal breath sounds.   Skin:     General: Skin is warm and dry.      Findings: No rash.   Neurological:      Mental Status: He is alert.   Psychiatric:         Mood and Affect: Mood normal.        ============================================  ARIADNE Phelan CNP  Signed Electronically by: ARIADNE Phelan CNP          "

## 2024-08-23 ENCOUNTER — LAB (OUTPATIENT)
Dept: LAB | Facility: CLINIC | Age: 29
End: 2024-08-23
Payer: COMMERCIAL

## 2024-08-23 DIAGNOSIS — Z11.3 SCREEN FOR STD (SEXUALLY TRANSMITTED DISEASE): ICD-10-CM

## 2024-08-23 PROCEDURE — 87491 CHLMYD TRACH DNA AMP PROBE: CPT

## 2024-08-23 PROCEDURE — 87591 N.GONORRHOEAE DNA AMP PROB: CPT

## 2024-08-24 LAB
C TRACH DNA SPEC QL NAA+PROBE: NEGATIVE
N GONORRHOEA DNA SPEC QL NAA+PROBE: NEGATIVE

## 2024-08-25 PROBLEM — R03.0 ELEVATED BP WITHOUT DIAGNOSIS OF HYPERTENSION: Status: ACTIVE | Noted: 2024-08-25

## 2024-08-25 NOTE — ASSESSMENT & PLAN NOTE
Elevated BP without HTN at last physical exam.  Is here for follow up.  Is working on diet and exercise.    Denies chest pain, dyspnea, edema, or TIA's.      Wt Readings from Last 4 Encounters:   08/21/24 86.7 kg (191 lb 1.6 oz)   11/06/23 89.4 kg (197 lb 1.6 oz)   01/26/23 85.3 kg (188 lb)   01/05/23 87.5 kg (193 lb)     Has lost a bit of weight.  Less stress with divorce moving forward.  Discussed healthy lifestyle and continuing to monitor given significant family history of hypertension.

## 2025-08-31 ENCOUNTER — HEALTH MAINTENANCE LETTER (OUTPATIENT)
Age: 30
End: 2025-08-31